# Patient Record
Sex: MALE | Race: BLACK OR AFRICAN AMERICAN | NOT HISPANIC OR LATINO | Employment: OTHER | URBAN - METROPOLITAN AREA
[De-identification: names, ages, dates, MRNs, and addresses within clinical notes are randomized per-mention and may not be internally consistent; named-entity substitution may affect disease eponyms.]

---

## 2018-01-31 ENCOUNTER — NURSE TRIAGE (OUTPATIENT)
Dept: ADMINISTRATIVE | Facility: CLINIC | Age: 63
End: 2018-01-31

## 2018-02-01 NOTE — TELEPHONE ENCOUNTER
"  Reason for Disposition   Symptoms only or mainly in 1 ear (unilateral tinnitus)    Answer Assessment - Initial Assessment Questions  Daughter calling for pt who was seen in an urgent care this week-end for c/o of hearing "crickets" in his left ear. They denied fluid or other problems with ear and he was advised to make appt with ENT. She is not aware of any meds that he is on.    Protocols used: [facility] TINNITUS-A-    "

## 2018-02-14 ENCOUNTER — HOSPITAL ENCOUNTER (OUTPATIENT)
Dept: RADIOLOGY | Facility: OTHER | Age: 63
Discharge: HOME | End: 2018-02-14
Attending: OTOLARYNGOLOGY
Payer: COMMERCIAL

## 2018-02-14 DIAGNOSIS — H93.12 TINNITUS OF LEFT EAR: ICD-10-CM

## 2018-02-14 PROCEDURE — 70553 MRI BRAIN STEM W/O & W/DYE: CPT | Mod: TC

## 2018-02-14 PROCEDURE — A9585 GADOBUTROL INJECTION: HCPCS | Performed by: OTOLARYNGOLOGY

## 2018-02-14 PROCEDURE — 25500020 PHARM REV CODE 255: Performed by: OTOLARYNGOLOGY

## 2018-02-14 PROCEDURE — 70553 MRI BRAIN STEM W/O & W/DYE: CPT | Mod: 26,,, | Performed by: RADIOLOGY

## 2018-02-14 RX ORDER — GADOBUTROL 604.72 MG/ML
10 INJECTION INTRAVENOUS
Status: COMPLETED | OUTPATIENT
Start: 2018-02-14 | End: 2018-02-14

## 2018-02-14 RX ADMIN — GADOBUTROL 10 ML: 604.72 INJECTION INTRAVENOUS at 01:02

## 2018-03-14 ENCOUNTER — HOSPITAL ENCOUNTER (OUTPATIENT)
Dept: RADIOLOGY | Facility: OTHER | Age: 63
Discharge: HOME | End: 2018-03-14
Attending: OTOLARYNGOLOGY
Payer: COMMERCIAL

## 2018-03-14 DIAGNOSIS — H93.12 LEFT-SIDED TINNITUS: ICD-10-CM

## 2018-03-14 PROCEDURE — 25500020 PHARM REV CODE 255: Performed by: OTOLARYNGOLOGY

## 2018-03-14 PROCEDURE — 70481 CT ORBIT/EAR/FOSSA W/DYE: CPT | Mod: TC

## 2018-03-14 PROCEDURE — 70481 CT ORBIT/EAR/FOSSA W/DYE: CPT | Mod: 26,,, | Performed by: INTERNAL MEDICINE

## 2018-03-14 RX ADMIN — IOHEXOL 75 ML: 350 INJECTION, SOLUTION INTRAVENOUS at 12:03

## 2019-04-28 ENCOUNTER — HOSPITAL ENCOUNTER (EMERGENCY)
Facility: HOSPITAL | Age: 64
Discharge: HOME | End: 2019-04-28
Attending: EMERGENCY MEDICINE
Payer: COMMERCIAL

## 2019-04-28 VITALS
TEMPERATURE: 99 F | RESPIRATION RATE: 16 BRPM | HEIGHT: 74 IN | DIASTOLIC BLOOD PRESSURE: 64 MMHG | HEART RATE: 73 BPM | SYSTOLIC BLOOD PRESSURE: 116 MMHG | BODY MASS INDEX: 32.08 KG/M2 | OXYGEN SATURATION: 98 % | WEIGHT: 250 LBS

## 2019-04-28 DIAGNOSIS — M25.511 SHOULDER PAIN, RIGHT: ICD-10-CM

## 2019-04-28 DIAGNOSIS — I10 HYPERTENSION: ICD-10-CM

## 2019-04-28 DIAGNOSIS — M79.10 MYALGIA: Primary | ICD-10-CM

## 2019-04-28 LAB
ANION GAP SERPL CALC-SCNC: 8 MMOL/L (ref 8–16)
BUN SERPL-MCNC: 14 MG/DL (ref 8–23)
CALCIUM SERPL-MCNC: 10.2 MG/DL (ref 8.7–10.5)
CHLORIDE SERPL-SCNC: 107 MMOL/L (ref 95–110)
CK SERPL-CCNC: 232 U/L (ref 20–200)
CO2 SERPL-SCNC: 24 MMOL/L (ref 23–29)
CREAT SERPL-MCNC: 1.4 MG/DL (ref 0.5–1.4)
EST. GFR  (AFRICAN AMERICAN): >60 ML/MIN/1.73 M^2
EST. GFR  (NON AFRICAN AMERICAN): 54 ML/MIN/1.73 M^2
GLUCOSE SERPL-MCNC: 104 MG/DL (ref 70–110)
POTASSIUM SERPL-SCNC: 4.3 MMOL/L (ref 3.5–5.1)
SODIUM SERPL-SCNC: 139 MMOL/L (ref 136–145)

## 2019-04-28 PROCEDURE — 99284 EMERGENCY DEPT VISIT MOD MDM: CPT | Mod: 25

## 2019-04-28 PROCEDURE — 63600175 PHARM REV CODE 636 W HCPCS: Performed by: EMERGENCY MEDICINE

## 2019-04-28 PROCEDURE — 82550 ASSAY OF CK (CPK): CPT

## 2019-04-28 PROCEDURE — 64450 NJX AA&/STRD OTHER PN/BRANCH: CPT

## 2019-04-28 PROCEDURE — 96374 THER/PROPH/DIAG INJ IV PUSH: CPT

## 2019-04-28 PROCEDURE — 25000003 PHARM REV CODE 250: Performed by: EMERGENCY MEDICINE

## 2019-04-28 PROCEDURE — 80048 BASIC METABOLIC PNL TOTAL CA: CPT

## 2019-04-28 RX ORDER — TADALAFIL 20 MG/1
20 TABLET ORAL
COMMUNITY
Start: 2019-03-15 | End: 2019-06-13

## 2019-04-28 RX ORDER — METHOCARBAMOL 500 MG/1
500 TABLET, FILM COATED ORAL
Status: COMPLETED | OUTPATIENT
Start: 2019-04-28 | End: 2019-04-28

## 2019-04-28 RX ORDER — CYCLOBENZAPRINE HCL 10 MG
10 TABLET ORAL 2 TIMES DAILY PRN
Qty: 15 TABLET | Refills: 0 | Status: SHIPPED | OUTPATIENT
Start: 2019-04-28 | End: 2019-05-03

## 2019-04-28 RX ORDER — LIDOCAINE HYDROCHLORIDE 20 MG/ML
5 INJECTION, SOLUTION INFILTRATION; PERINEURAL
Status: COMPLETED | OUTPATIENT
Start: 2019-04-28 | End: 2019-04-28

## 2019-04-28 RX ORDER — AMLODIPINE BESYLATE 5 MG/1
10 TABLET ORAL
Status: COMPLETED | OUTPATIENT
Start: 2019-04-28 | End: 2019-04-28

## 2019-04-28 RX ORDER — LORAZEPAM 0.5 MG/1
1 TABLET ORAL
Status: COMPLETED | OUTPATIENT
Start: 2019-04-28 | End: 2019-04-28

## 2019-04-28 RX ORDER — LORAZEPAM 1 MG/1
1 TABLET ORAL EVERY 8 HOURS PRN
Qty: 10 TABLET | Refills: 0 | Status: SHIPPED | OUTPATIENT
Start: 2019-04-29

## 2019-04-28 RX ORDER — GABAPENTIN 100 MG/1
CAPSULE ORAL
COMMUNITY
Start: 2019-04-20

## 2019-04-28 RX ORDER — AMLODIPINE BESYLATE 10 MG/1
10 TABLET ORAL
COMMUNITY
Start: 2018-06-18 | End: 2019-06-18

## 2019-04-28 RX ORDER — CLONIDINE HYDROCHLORIDE 0.1 MG/1
0.1 TABLET ORAL
Status: COMPLETED | OUTPATIENT
Start: 2019-04-28 | End: 2019-04-28

## 2019-04-28 RX ORDER — TRIAMCINOLONE ACETONIDE 40 MG/ML
40 INJECTION, SUSPENSION INTRA-ARTICULAR; INTRAMUSCULAR
Status: COMPLETED | OUTPATIENT
Start: 2019-04-28 | End: 2019-04-28

## 2019-04-28 RX ORDER — OLMESARTAN MEDOXOMIL 40 MG/1
TABLET ORAL
Refills: 3 | COMMUNITY
Start: 2019-03-08

## 2019-04-28 RX ORDER — KETOROLAC TROMETHAMINE 30 MG/ML
60 INJECTION, SOLUTION INTRAMUSCULAR; INTRAVENOUS
Status: DISCONTINUED | OUTPATIENT
Start: 2019-04-28 | End: 2019-04-28

## 2019-04-28 RX ORDER — TIZANIDINE 2 MG/1
4 TABLET ORAL
COMMUNITY
Start: 2019-03-13 | End: 2019-04-28 | Stop reason: ALTCHOICE

## 2019-04-28 RX ADMIN — METHOCARBAMOL 500 MG: 500 TABLET, FILM COATED ORAL at 03:04

## 2019-04-28 RX ADMIN — AMLODIPINE BESYLATE 10 MG: 5 TABLET ORAL at 03:04

## 2019-04-28 RX ADMIN — TRIAMCINOLONE ACETONIDE 40 MG: 40 INJECTION, SUSPENSION INTRA-ARTICULAR; INTRAMUSCULAR at 04:04

## 2019-04-28 RX ADMIN — CLONIDINE HYDROCHLORIDE 0.1 MG: 0.1 TABLET ORAL at 03:04

## 2019-04-28 RX ADMIN — LIDOCAINE HYDROCHLORIDE 5 ML: 20 INJECTION, SOLUTION INFILTRATION; PERINEURAL at 04:04

## 2019-04-28 RX ADMIN — LORAZEPAM 1 MG: 0.5 TABLET ORAL at 03:04

## 2019-04-28 NOTE — ED PROVIDER NOTES
Encounter Date: 4/28/2019    SCRIBE #1 NOTE: I, Jonny Saleem, am scribing for, and in the presence of,  Paul Loaiza MD. I have scribed the following portions of the note - Other sections scribed: HPI, ROS and PE.       History     Chief Complaint   Patient presents with    Shoulder Pain     Pt reports prior injury to R neck and shoulder. Pt comes in today for increased pain to the shoulder and no relief with Rx meds and treatments.      CC: Shoulder Pain    HPI: Patient is a 63-year-old male who presents with atraumatic right shoulder pain radiating to the neck that began 3 months ago. Patient reports he felt the sensation of a drop of water traveling from his shoulder down to his fingertips at the time of onset. He denies injury. Patient saw his PCP for the issue and received a cortisone shot; however, this only temporarily relieved the pain. He was prescribed gabapentin as well. He also saw an orthopaedist but did not receive a formal diagnosis. He denies weakness, numbness but states he intermittently has paresthesias (tinging sensation) in the 4th and 5th digits of his right hand. He denies heavy lifting. He has an appointment with pain management on Wednesday this week. He has tried Tylenol and heat compresses without relief. Left hand dominant.     The history is provided by the patient. No  was used.     Review of patient's allergies indicates:  No Known Allergies  No past medical history on file.  No past surgical history on file.  No family history on file.  Social History     Tobacco Use    Smoking status: Not on file   Substance Use Topics    Alcohol use: Not on file    Drug use: Not on file     Review of Systems   Constitutional: Negative for activity change, diaphoresis, fatigue, fever and unexpected weight change.   HENT: Negative for congestion, ear discharge, facial swelling, sinus pressure, sinus pain and trouble swallowing.    Eyes: Negative for pain, discharge and  redness.   Respiratory: Negative for apnea, cough, choking, chest tightness, shortness of breath, wheezing and stridor.    Cardiovascular: Negative for chest pain, palpitations and leg swelling.   Gastrointestinal: Negative for abdominal distention, abdominal pain, diarrhea, nausea and vomiting.   Genitourinary: Negative for difficulty urinating, dysuria and urgency.   Musculoskeletal: Positive for arthralgias (R shoulder), back pain, myalgias and neck pain. Negative for gait problem, joint swelling and neck stiffness.   Skin: Negative for color change, pallor, rash and wound.   Neurological: Negative for dizziness, tremors, seizures, syncope, facial asymmetry, speech difficulty, weakness, light-headedness, numbness and headaches.        (+) paresthesias   Psychiatric/Behavioral: Negative for agitation, behavioral problems, decreased concentration and suicidal ideas.   All other systems reviewed and are negative.      Physical Exam     Initial Vitals [04/28/19 1422]   BP Pulse Resp Temp SpO2   (!) 184/107 75 18 98.6 °F (37 °C) 97 %      MAP       --         Physical Exam    Nursing note and vitals reviewed.  Constitutional: He appears well-developed and well-nourished. He is not diaphoretic. No distress.   HENT:   Head: Normocephalic and atraumatic.   Right Ear: External ear normal.   Left Ear: External ear normal.   Nose: Nose normal.   Mouth/Throat: Oropharynx is clear and moist. No oropharyngeal exudate.   Eyes: Conjunctivae and EOM are normal. Pupils are equal, round, and reactive to light. Right eye exhibits no discharge. Left eye exhibits no discharge. No scleral icterus.   Neck: Normal range of motion. Neck supple. No thyromegaly present. No tracheal deviation present. No JVD present.   Cardiovascular: Normal rate, regular rhythm, normal heart sounds and intact distal pulses.   Pulmonary/Chest: Breath sounds normal. No stridor. No respiratory distress. He has no wheezes. He has no rales.   Abdominal: Soft.  Bowel sounds are normal. He exhibits no distension. There is no tenderness. There is no rebound and no guarding.   Musculoskeletal: Normal range of motion. He exhibits tenderness. He exhibits no edema.   Right shoulder with increase in pain with supination/pronation of arm. Patient is unable to fully place his arm behind his back secondary to pain. No overt deformity of right shoulder. Right trapezius muscle is overall moderately tender to touch with intermittent spasms. Pinpoint tenderness over the upper midline right trapezius. No midline cervical spine tenderness.    Neurological: He is alert and oriented to person, place, and time. He has normal strength. He displays normal reflexes. No cranial nerve deficit or sensory deficit. GCS score is 15. GCS eye subscore is 4. GCS verbal subscore is 5. GCS motor subscore is 6.   Skin: Skin is warm and dry. Capillary refill takes less than 2 seconds. No rash noted. No erythema.   Psychiatric: He has a normal mood and affect. His behavior is normal. Judgment and thought content normal.         ED Course   Nerve Block  Date/Time: 5/3/2019 4:01 PM  Location procedure was performed: [facility] EMERGENCY DEPARTMENT  Performed by: Paul Loaiza MD  Authorized by: Paul Loaiza MD   Assisting provider: Paul Loaiza MD  Pre-operative diagnosis: Right shoulder and trapezius pain.  Post-operative diagnosis: Right shoulder and trapezius pain.   Consent Done: Yes  Consent: Verbal consent obtained. Written consent obtained.  Risks and benefits: risks, benefits and alternatives were discussed  Consent given by: patient (Patient's daughter also present (EM RN) and gave consent per patient's request)  Patient understanding: patient states understanding of the procedure being performed  Patient consent: the patient's understanding of the procedure matches consent given  Procedure consent: procedure consent matches procedure scheduled  Relevant documents: relevant documents  "present and verified  Test results: test results available and properly labeled  Site marked: the operative site was marked  Imaging studies: imaging studies available  Required items: required blood products, implants, devices, and special equipment available  Patient identity confirmed: name, MRN and verbally with patient  Time out: Immediately prior to procedure a "time out" was called to verify the correct patient, procedure, equipment, support staff and site/side marked as required.  Indications: pain relief  Body area: trunk  Laterality: right  Patient sedated: no  : Kenalog Description of findings: Area of maximum TTP identified.    Preparation: Patient was prepped and draped in the usual sterile fashion.  Patient position: sitting  Needle size: 25 G  Local Anesthetic: lidocaine 2% without epinephrine  Anesthetic total: 2 mL  Technical procedures used: Using sterile technique the needle was advanced under constant negative pressure until approximately 1cm beneath epidermis. 2cc of 2% lidocaine and 40mg of Kenalog injected.   Significant surgical tasks conducted by the assistant(s): Performed by myself.   Complications: No  Estimated blood loss (mL): 0  Specimens: No  Implants: No  Outcome: pain improved  Patient tolerance: Patient tolerated the procedure well with no immediate complications  Comments: After procedure, patient reports significant improvement in pain. On examination patient is able to supinate and pronate his arm without pain and is able to fully place this arm behind his back. Daughter reports he looks much more relaxed and is no longer holding his at a slight downward angle.         Labs Reviewed   BASIC METABOLIC PANEL - Abnormal; Notable for the following components:       Result Value    eGFR if non  54 (*)     All other components within normal limits   CK - Abnormal; Notable for the following components:     (*)     All other components within normal limits      "     Imaging Results          X-Ray Chest PA And Lateral (Final result)  Result time 04/28/19 16:32:59    Final result by Franca Salas MD (04/28/19 16:32:59)                 Impression:      Minimal subsegmental atelectasis      Electronically signed by: Franca Salas MD  Date:    04/28/2019  Time:    16:32             Narrative:    EXAMINATION:  XR CHEST PA AND LATERAL    CLINICAL HISTORY:  Essential (primary) hypertension    TECHNIQUE:  PA and lateral views of the chest were performed.    COMPARISON:  None    FINDINGS:  The cardiac silhouette is normal in size.  The pulmonary vascularity is normal.  Low lung volume, poor inspiratory effort.  Minimal subsegmental atelectasis at the lung bases.  No pleural effusion.  No pneumothorax.  The osseous structures appear within normal limits.                              Vitals:    04/28/19 1631 04/28/19 1700 04/28/19 1716 04/28/19 1731   BP: 131/80  139/85 116/64   Pulse: 72      Resp: 14 16     Temp:       TempSrc:       SpO2: 98%      Weight:       Height:        04/28/19 1734   BP:    Pulse: 73   Resp: 16   Temp: 98.5 °F (36.9 °C)   TempSrc:    SpO2: 98%   Weight:    Height:      Medications   LORazepam tablet 1 mg (1 mg Oral Given 4/28/19 1547)   methocarbamol tablet 500 mg (500 mg Oral Given 4/28/19 1547)   amLODIPine tablet 10 mg (10 mg Oral Given 4/28/19 1547)   cloNIDine tablet 0.1 mg (0.1 mg Oral Given 4/28/19 1547)   triamcinolone acetonide injection 40 mg (40 mg Other Given 4/28/19 1653)   lidocaine HCL 20 mg/ml (2%) injection 5 mL (5 mLs Infiltration Given 4/28/19 1653)          Medical Decision Making:   ED Management:  Patient appears to have musculoskeletal pain originating in right trapezius mm and causing intermittent spasms and paraesthesias.     Patient pain significantly improved with local lidocaine/steroid injection. Patient and daughter given information on physical therapy in order to access if this would be effective prior to  starting a pain medication regimen.     Patient and daughter are agreeable with plan. Strict return precautions discussed.             Scribe Attestation:   Scribe #1: I performed the above scribed service and the documentation accurately describes the services I performed. I attest to the accuracy of the note.    Attending Attestation:           Physician Attestation for Scribe:  Physician Attestation Statement for Scribe #1: I, Paul Loaiza MD, reviewed documentation, as scribed by Jonny Saleem in my presence, and it is both accurate and complete.                    Clinical Impression:       ICD-10-CM ICD-9-CM   1. Myalgia M79.10 729.1   2. Shoulder pain, right M25.511 719.41   3. Hypertension I10 401.9         Disposition:   Disposition: Discharged                        Paul Loaiza MD  05/03/19 0139

## 2019-04-28 NOTE — ED TRIAGE NOTES
Pt arrived to the ED due to right sided shoulder pain that radiates down pts right arm, left shoulder, and upper back that has been going on for 3 months now. Pt denies SOB, fever, n/v, and chills

## 2021-07-01 ENCOUNTER — PATIENT MESSAGE (OUTPATIENT)
Dept: ADMINISTRATIVE | Facility: OTHER | Age: 66
End: 2021-07-01

## 2022-01-24 ENCOUNTER — OFFICE VISIT (OUTPATIENT)
Dept: URGENT CARE | Facility: CLINIC | Age: 67
End: 2022-01-24
Payer: COMMERCIAL

## 2022-01-24 VITALS
OXYGEN SATURATION: 98 % | WEIGHT: 252 LBS | DIASTOLIC BLOOD PRESSURE: 95 MMHG | TEMPERATURE: 97 F | HEIGHT: 74 IN | RESPIRATION RATE: 18 BRPM | SYSTOLIC BLOOD PRESSURE: 172 MMHG | HEART RATE: 69 BPM | BODY MASS INDEX: 32.34 KG/M2

## 2022-01-24 DIAGNOSIS — M62.838 MUSCLE SPASMS OF NECK: ICD-10-CM

## 2022-01-24 DIAGNOSIS — S16.1XXA STRAIN OF NECK MUSCLE, INITIAL ENCOUNTER: Primary | ICD-10-CM

## 2022-01-24 PROCEDURE — 3080F PR MOST RECENT DIASTOLIC BLOOD PRESSURE >= 90 MM HG: ICD-10-PCS | Mod: CPTII,S$GLB,, | Performed by: PHYSICIAN ASSISTANT

## 2022-01-24 PROCEDURE — 3008F PR BODY MASS INDEX (BMI) DOCUMENTED: ICD-10-PCS | Mod: CPTII,S$GLB,, | Performed by: PHYSICIAN ASSISTANT

## 2022-01-24 PROCEDURE — 3008F BODY MASS INDEX DOCD: CPT | Mod: CPTII,S$GLB,, | Performed by: PHYSICIAN ASSISTANT

## 2022-01-24 PROCEDURE — 4010F ACE/ARB THERAPY RXD/TAKEN: CPT | Mod: CPTII,S$GLB,, | Performed by: PHYSICIAN ASSISTANT

## 2022-01-24 PROCEDURE — 4010F PR ACE/ARB THEARPY RXD/TAKEN: ICD-10-PCS | Mod: CPTII,S$GLB,, | Performed by: PHYSICIAN ASSISTANT

## 2022-01-24 PROCEDURE — 99203 PR OFFICE/OUTPT VISIT, NEW, LEVL III, 30-44 MIN: ICD-10-PCS | Mod: S$GLB,,, | Performed by: PHYSICIAN ASSISTANT

## 2022-01-24 PROCEDURE — 3080F DIAST BP >= 90 MM HG: CPT | Mod: CPTII,S$GLB,, | Performed by: PHYSICIAN ASSISTANT

## 2022-01-24 PROCEDURE — 1160F PR REVIEW ALL MEDS BY PRESCRIBER/CLIN PHARMACIST DOCUMENTED: ICD-10-PCS | Mod: CPTII,S$GLB,, | Performed by: PHYSICIAN ASSISTANT

## 2022-01-24 PROCEDURE — 1160F RVW MEDS BY RX/DR IN RCRD: CPT | Mod: CPTII,S$GLB,, | Performed by: PHYSICIAN ASSISTANT

## 2022-01-24 PROCEDURE — 1159F PR MEDICATION LIST DOCUMENTED IN MEDICAL RECORD: ICD-10-PCS | Mod: CPTII,S$GLB,, | Performed by: PHYSICIAN ASSISTANT

## 2022-01-24 PROCEDURE — 99203 OFFICE O/P NEW LOW 30 MIN: CPT | Mod: S$GLB,,, | Performed by: PHYSICIAN ASSISTANT

## 2022-01-24 PROCEDURE — 3077F SYST BP >= 140 MM HG: CPT | Mod: CPTII,S$GLB,, | Performed by: PHYSICIAN ASSISTANT

## 2022-01-24 PROCEDURE — 1159F MED LIST DOCD IN RCRD: CPT | Mod: CPTII,S$GLB,, | Performed by: PHYSICIAN ASSISTANT

## 2022-01-24 PROCEDURE — 3077F PR MOST RECENT SYSTOLIC BLOOD PRESSURE >= 140 MM HG: ICD-10-PCS | Mod: CPTII,S$GLB,, | Performed by: PHYSICIAN ASSISTANT

## 2022-01-24 RX ORDER — AMLODIPINE BESYLATE 5 MG/1
5 TABLET ORAL
COMMUNITY
Start: 2021-10-20 | End: 2022-10-20

## 2022-01-24 RX ORDER — METHOCARBAMOL 500 MG/1
1000 TABLET, FILM COATED ORAL 3 TIMES DAILY
Qty: 30 TABLET | Refills: 0 | Status: SHIPPED | OUTPATIENT
Start: 2022-01-24 | End: 2022-01-31

## 2022-01-24 RX ORDER — ASPIRIN 81 MG/1
81 TABLET ORAL
COMMUNITY

## 2022-01-24 NOTE — PROGRESS NOTES
"Subjective:       Patient ID: Sammy Eller is a 66 y.o. male.    Vitals:  height is 6' 2" (1.88 m) and weight is 114.3 kg (252 lb). His tympanic temperature is 97.4 °F (36.3 °C). His blood pressure is 172/95 (abnormal) and his pulse is 69. His respiration is 18 and oxygen saturation is 98%.     Chief Complaint: Otalgia (Left ear) and Neck Pain    Pt states that he  Is having left ear pain and neck stiffness that started 5 days ago.  He says he thinks he slept funny.  Pt is taking tylenol .     Otalgia   There is pain in the left ear. This is a new problem. The current episode started in the past 7 days. The problem occurs constantly. The problem has been unchanged. There has been no fever. The pain is at a severity of 3/10. The pain is mild. Associated symptoms include neck pain. Pertinent negatives include no diarrhea, headaches, rash or vomiting. He has tried acetaminophen for the symptoms.       Constitution: Negative for chills and fever.   HENT: Positive for ear pain. Negative for congestion.    Neck: Positive for neck pain and neck stiffness.   Cardiovascular: Negative for chest pain.   Respiratory: Negative for shortness of breath.    Gastrointestinal: Negative for nausea, vomiting and diarrhea.   Musculoskeletal: Negative for pain, joint pain and back pain.   Skin: Negative for color change, rash and wound.   Neurological: Negative for dizziness, headaches, numbness and tingling.   Psychiatric/Behavioral: Negative for nervous/anxious. The patient is not nervous/anxious.        Objective:      Physical Exam   Constitutional: He is oriented to person, place, and time. He appears well-developed. He is cooperative.  Non-toxic appearance. He does not appear ill. No distress.   HENT:   Head: Normocephalic and atraumatic.   Ears:   Right Ear: Hearing normal.   Left Ear: Hearing normal.   Eyes: Conjunctivae and lids are normal. No scleral icterus.   Neck: Trachea normal and phonation normal. Neck supple.          " Comments: Neck:  He is able to flex full but with pain.  He is unable to extend secondary to pain.  He is able to lateral flex in both directions to approximately 15 degrees with pain.  He is able to rotate in both directions to approximately 30 degrees but with pain.  No edema present. No erythema present. No neck rigidity present. decreased range of motion present. pain with movement present. No spinous process tenderness present. muscular tenderness (left) present.   Cardiovascular: Normal rate, regular rhythm, normal heart sounds and normal pulses.   Pulmonary/Chest: Effort normal and breath sounds normal. No respiratory distress. He has no decreased breath sounds. He has no rhonchi.   Abdominal: Normal appearance.   Musculoskeletal:         General: No tenderness or deformity.   Neurological: He is alert and oriented to person, place, and time. He exhibits normal muscle tone. Coordination normal.   Skin: Skin is warm, dry, intact, not diaphoretic, not pale and no rash.   Psychiatric: His speech is normal and behavior is normal. Judgment and thought content normal.   Nursing note and vitals reviewed.        Assessment:       1. Strain of neck muscle, initial encounter    2. Muscle spasms of neck          Plan:         Strain of neck muscle, initial encounter  -     methocarbamoL (ROBAXIN) 500 MG Tab; Take 2 tablets (1,000 mg total) by mouth 3 (three) times daily. for 7 days  Dispense: 30 tablet; Refill: 0    Muscle spasms of neck  -     methocarbamoL (ROBAXIN) 500 MG Tab; Take 2 tablets (1,000 mg total) by mouth 3 (three) times daily. for 7 days  Dispense: 30 tablet; Refill: 0                 Patient Instructions   - Rest.  - Drink plenty of fluids.  - Take Tylenol and/or Ibuprofen as directed as needed for fever/pain.  Do not take more than the recommended dose.  - follow up with your PCP within the next 1-2 weeks as needed.  - No heavy lifting.    - Low heat to areas of pain for 20 minutes at a time.  - Go to  the ER for any weakness or sensation loss of the legs, or loss of bowel or bladder control.  - You must understand that you have received an Urgent Care treatment only and that you may be released before all of your medical problems are known or treated.   - You, the patient, will arrange for follow up care as instructed.   - If your condition worsens or fails to improve we recommend that you receive another evaluation at the ER immediately or contact your PCP to discuss your concerns.   - You can call [phone] or [phone] to help schedule an appointment with the appropriate provider.    Patient Education       Cervical Muscle Strain Discharge Instructions   About this topic   A cervical muscle strain happens when the muscles in your neck are is stretched too much. This is the medical name for a whiplash injury that happens when your head is suddenly jerked forward and backward. A cervical strain most often happens from a car crash or sports injury.  Your neck has many parts including bones, muscles, tendons, ligaments, and nerves. Vertebrae, the bones in your spine, start at the base of your skull and extend down the back of your neck. There are discs between the vertebrae to cushion the bones. Ligaments, muscles, and tendons help hold your spine in place and let you move your neck. Your spinal cord, the major nerve of your body, starts at the base of your brain and extends down your back. It is protected by your vertebrae. Smaller nerves travel from your spinal cord to your muscles and skin. Most neck pain is caused by an injury to a ligament, tendon, muscle, or nerve.     What care is needed at home?   · Ask your doctor what you need to do when you go home. Make sure you ask questions if you do not understand what the doctor says. This way you will know what you need to do.  · Wear your neck brace or cushion as you were told to. If the doctor told you to, you may start doing gentle neck stretches in a  few days.  · For recent sprains, place an ice pack or a bag of frozen vegetables wrapped in a towel over the painful part. Never put ice right on the skin. Use ice every 1 to 2 hours for 10 to 15 minutes at a time. Use for the first 24 to 48 hours after your injury.  · Use heat after the first 24 to 48 hours, but not right away. Put a heating pad on the painful part for no more than 20 minutes at a time. Never go to sleep with a heating pad on as this can cause burns. You can also take a hot shower or bath.  · You may want to take medicines like ibuprofen or naproxen for swelling and pain. These are nonsteroidal anti-inflammatory drugs (NSAIDs).  · Try to practice good posture to avoid putting strain on your neck. Sit up straight and keep your shoulders back. It can also help to avoid sitting in the same position for too long and to avoid putting pressure on your upper back by carrying heavy things. When you sleep, try to keep your neck in line with the rest of your body.  What follow-up care is needed?   Your doctor may ask you to make visits to the office to check on your progress. Be sure to keep these visits. Your doctor may send you to physical therapy or a chiropractor to help you heal faster.  What drugs may be needed?   The doctor may order drugs to:  · Help with pain and swelling  · Relax muscles  What can be done to prevent this health problem?   · Always wear a seat belt. Drive safely. Obey speed limits. Do not drink and drive. Do not text and drive.  · Have headrests in the car at the right height. The middle of the headrest should be even with the upper parts of your ears.  · Use good posture. Do not slouch.  · Take breaks often when doing things that use repeat movements.  · Warm up slowly and stretch before you work out. Use good ways to train, such as slowly adding to how far you run. Do not work out if you are overly tired. Take extra care if working out in cold weather.  · Keep a healthy weight so  there is not extra stress on your joints. Eat a healthy diet to keep your muscles healthy.  · Stay active and work out to keep your muscles strong and flexible.  · Use the proper pillow.  When do I need to call the doctor?   · You have trouble breathing.  · You are too weak to stand or cannot move one or both of your arms or legs.  · You have new weakness in one or both of your arms or legs.  · You have numbness, tingling, or shooting pain in one or both of your arms or legs.  · You have bad pain that is not helped by pain medicine.  · Your symptoms are not getting better after treating them at home for a few weeks.  · Your symptoms are getting worse and you cannot do normal activities like dress yourself or eat.  Teach Back: Helping You Understand   The Teach Back Method helps you understand the information we are giving you. After you talk with the staff, tell them in your own words what you learned. This helps to make sure the staff has described each thing clearly. It also helps to explain things that may have been confusing. Before going home, make sure you can do these:  · I can tell you about my condition.  · I can tell you what may help ease my pain.  · I can tell you what I will do if I have more pain or swelling.  Where can I learn more?   American [employer]  http://orthoinfo.aaos.org/topic.cfm?hjusy=N27649   [employer]  https://www.betterhealth.[place].gov.[place]/health/ConditionsAndTreatments/neck-pain   Last Reviewed Date   2021-06-10  Consumer Information Use and Disclaimer   This information is not specific medical advice and does not replace information you receive from your health care provider. This is only a brief summary of general information. It does NOT include all information about conditions, illnesses, injuries, tests, procedures, treatments, therapies, discharge instructions or life-style choices that may apply to you. You must talk with your health care provider  for complete information about your health and treatment options. This information should not be used to decide whether or not to accept your health care providers advice, instructions or recommendations. Only your health care provider has the knowledge and training to provide advice that is right for you.  Copyright   Copyright © 2021 [employer] and its affiliates and/or licensors. All rights reserved.  Patient Education       Using Heat for Pain   About this topic   Heat is one of a few ways to help manage pain. Heat increases blood flow to an area. Heat also relaxes tight and sore muscles.  Heat can help many problems, such as:  · Arthritis  · Back or neck pain  · Generalized stiffness  · Cramps or muscle spasms  · Fibromyalgia  · Trigger points  · Extremity pain  · Bursitis  · Headaches  · Muscle soreness  General   Doctors and therapists may use heat as a part of your care. Here are ways you can use heat at home:  · Hot tubs, steam room, sauna, or a hot bath - Be sure that these are not too hot. Be sure not to stay in the bath or hot tub for too long. Take extra care when getting out.  · Hot water bottles - Cover and place on the sore area.  · Electric heating pad or hot water bottle - Cover and place on the sore area. Be sure to have a thin layer between the heat and skin. Never sleep with a heating pad on as this can cause burns. Use heat on your painful part for no more than 20 minutes at a time.  · Hot compress - Wet a washcloth with hot water and then put it on the sore area.  · Heat wraps or patches - You can buy these single use patches in stores.  · Gel or rice packs - Heat in a microwave oven and place on your sore part. Check to make sure they are not too hot.  · Paraffin wax - You dip your hand or foot into melted wax several times. Then wrap it with plastic or a towel for 10 to 20 minutes.  Do not use heat or talk to your doctor first if you:  · Are pregnant  · Have swelling  · Have an  infection  · Have skin problems like blisters, open sores, or dermatitis  · Have problems with sensation or feeling  · Have conditions like diabetes, heart problems, or blood pressure problems  · Have trouble with blood vessels or blood clots  What problems could happen?   · Burns  · Increased swelling  · Blisters  · Contact dermatitis  · Pain continues  Helpful tips   · Most often, you should not use heat within the first 48 hours after an injury.  · Make your own microwavable heat pack. Fill a long sock with rice. Sew or tie the end shut. Heat in the microwave 30 seconds at a time for up to 2 minutes until you get the desired temperature.  · Warm your clothes in the dryer before you put them on for all over comfort.  · Call your doctor if you see discolored skin, blisters, or swelling underneath the area where you had the heat source.  Last Reviewed Date   2020-06-02  Consumer Information Use and Disclaimer   This information is not specific medical advice and does not replace information you receive from your health care provider. This is only a brief summary of general information. It does NOT include all information about conditions, illnesses, injuries, tests, procedures, treatments, therapies, discharge instructions or life-style choices that may apply to you. You must talk with your health care provider for complete information about your health and treatment options. This information should not be used to decide whether or not to accept your health care providers advice, instructions or recommendations. Only your health care provider has the knowledge and training to provide advice that is right for you.  Copyright   Copyright © 2021 [employer] and its affiliates and/or licensors. All rights reserved.

## 2022-01-24 NOTE — PATIENT INSTRUCTIONS
- Rest.  - Drink plenty of fluids.  - Take Tylenol and/or Ibuprofen as directed as needed for fever/pain.  Do not take more than the recommended dose.  - follow up with your PCP within the next 1-2 weeks as needed.  - No heavy lifting.    - Low heat to areas of pain for 20 minutes at a time.  - Go to the ER for any weakness or sensation loss of the legs, or loss of bowel or bladder control.  - You must understand that you have received an Urgent Care treatment only and that you may be released before all of your medical problems are known or treated.   - You, the patient, will arrange for follow up care as instructed.   - If your condition worsens or fails to improve we recommend that you receive another evaluation at the ER immediately or contact your PCP to discuss your concerns.   - You can call [phone] or [phone] to help schedule an appointment with the appropriate provider.    Patient Education       Cervical Muscle Strain Discharge Instructions   About this topic   A cervical muscle strain happens when the muscles in your neck are is stretched too much. This is the medical name for a whiplash injury that happens when your head is suddenly jerked forward and backward. A cervical strain most often happens from a car crash or sports injury.  Your neck has many parts including bones, muscles, tendons, ligaments, and nerves. Vertebrae, the bones in your spine, start at the base of your skull and extend down the back of your neck. There are discs between the vertebrae to cushion the bones. Ligaments, muscles, and tendons help hold your spine in place and let you move your neck. Your spinal cord, the major nerve of your body, starts at the base of your brain and extends down your back. It is protected by your vertebrae. Smaller nerves travel from your spinal cord to your muscles and skin. Most neck pain is caused by an injury to a ligament, tendon, muscle, or nerve.     What care is needed at home?    · Ask your doctor what you need to do when you go home. Make sure you ask questions if you do not understand what the doctor says. This way you will know what you need to do.  · Wear your neck brace or cushion as you were told to. If the doctor told you to, you may start doing gentle neck stretches in a few days.  · For recent sprains, place an ice pack or a bag of frozen vegetables wrapped in a towel over the painful part. Never put ice right on the skin. Use ice every 1 to 2 hours for 10 to 15 minutes at a time. Use for the first 24 to 48 hours after your injury.  · Use heat after the first 24 to 48 hours, but not right away. Put a heating pad on the painful part for no more than 20 minutes at a time. Never go to sleep with a heating pad on as this can cause burns. You can also take a hot shower or bath.  · You may want to take medicines like ibuprofen or naproxen for swelling and pain. These are nonsteroidal anti-inflammatory drugs (NSAIDs).  · Try to practice good posture to avoid putting strain on your neck. Sit up straight and keep your shoulders back. It can also help to avoid sitting in the same position for too long and to avoid putting pressure on your upper back by carrying heavy things. When you sleep, try to keep your neck in line with the rest of your body.  What follow-up care is needed?   Your doctor may ask you to make visits to the office to check on your progress. Be sure to keep these visits. Your doctor may send you to physical therapy or a chiropractor to help you heal faster.  What drugs may be needed?   The doctor may order drugs to:  · Help with pain and swelling  · Relax muscles  What can be done to prevent this health problem?   · Always wear a seat belt. Drive safely. Obey speed limits. Do not drink and drive. Do not text and drive.  · Have headrests in the car at the right height. The middle of the headrest should be even with the upper parts of your ears.  · Use good posture. Do not  slouch.  · Take breaks often when doing things that use repeat movements.  · Warm up slowly and stretch before you work out. Use good ways to train, such as slowly adding to how far you run. Do not work out if you are overly tired. Take extra care if working out in cold weather.  · Keep a healthy weight so there is not extra stress on your joints. Eat a healthy diet to keep your muscles healthy.  · Stay active and work out to keep your muscles strong and flexible.  · Use the proper pillow.  When do I need to call the doctor?   · You have trouble breathing.  · You are too weak to stand or cannot move one or both of your arms or legs.  · You have new weakness in one or both of your arms or legs.  · You have numbness, tingling, or shooting pain in one or both of your arms or legs.  · You have bad pain that is not helped by pain medicine.  · Your symptoms are not getting better after treating them at home for a few weeks.  · Your symptoms are getting worse and you cannot do normal activities like dress yourself or eat.  Teach Back: Helping You Understand   The Teach Back Method helps you understand the information we are giving you. After you talk with the staff, tell them in your own words what you learned. This helps to make sure the staff has described each thing clearly. It also helps to explain things that may have been confusing. Before going home, make sure you can do these:  · I can tell you about my condition.  · I can tell you what may help ease my pain.  · I can tell you what I will do if I have more pain or swelling.  Where can I learn more?   American Academy of Orthopedic Surgeons  http://orthoinfo.aaos.org/topic.cfm?wbasz=E56686   [employer]  https://www.betterhealth.[place].gov.[place]/health/ConditionsAndTreatments/neck-pain   Last Reviewed Date   2021-06-10  Consumer Information Use and Disclaimer   This information is not specific medical advice and does not replace information you receive from your  health care provider. This is only a brief summary of general information. It does NOT include all information about conditions, illnesses, injuries, tests, procedures, treatments, therapies, discharge instructions or life-style choices that may apply to you. You must talk with your health care provider for complete information about your health and treatment options. This information should not be used to decide whether or not to accept your health care providers advice, instructions or recommendations. Only your health care provider has the knowledge and training to provide advice that is right for you.  Copyright   Copyright © 2021 [employer], [employer]. and its affiliates and/or licensors. All rights reserved.  Patient Education       Using Heat for Pain   About this topic   Heat is one of a few ways to help manage pain. Heat increases blood flow to an area. Heat also relaxes tight and sore muscles.  Heat can help many problems, such as:  · Arthritis  · Back or neck pain  · Generalized stiffness  · Cramps or muscle spasms  · Fibromyalgia  · Trigger points  · Extremity pain  · Bursitis  · Headaches  · Muscle soreness  General   Doctors and therapists may use heat as a part of your care. Here are ways you can use heat at home:  · Hot tubs, steam room, sauna, or a hot bath - Be sure that these are not too hot. Be sure not to stay in the bath or hot tub for too long. Take extra care when getting out.  · Hot water bottles - Cover and place on the sore area.  · Electric heating pad or hot water bottle - Cover and place on the sore area. Be sure to have a thin layer between the heat and skin. Never sleep with a heating pad on as this can cause burns. Use heat on your painful part for no more than 20 minutes at a time.  · Hot compress - Wet a washcloth with hot water and then put it on the sore area.  · Heat wraps or patches - You can buy these single use patches in stores.  · Gel or rice packs - Heat in a microwave oven and  place on your sore part. Check to make sure they are not too hot.  · Paraffin wax - You dip your hand or foot into melted wax several times. Then wrap it with plastic or a towel for 10 to 20 minutes.  Do not use heat or talk to your doctor first if you:  · Are pregnant  · Have swelling  · Have an infection  · Have skin problems like blisters, open sores, or dermatitis  · Have problems with sensation or feeling  · Have conditions like diabetes, heart problems, or blood pressure problems  · Have trouble with blood vessels or blood clots  What problems could happen?   · Burns  · Increased swelling  · Blisters  · Contact dermatitis  · Pain continues  Helpful tips   · Most often, you should not use heat within the first 48 hours after an injury.  · Make your own microwavable heat pack. Fill a long sock with rice. Sew or tie the end shut. Heat in the microwave 30 seconds at a time for up to 2 minutes until you get the desired temperature.  · Warm your clothes in the dryer before you put them on for all over comfort.  · Call your doctor if you see discolored skin, blisters, or swelling underneath the area where you had the heat source.  Last Reviewed Date   2020-06-02  Consumer Information Use and Disclaimer   This information is not specific medical advice and does not replace information you receive from your health care provider. This is only a brief summary of general information. It does NOT include all information about conditions, illnesses, injuries, tests, procedures, treatments, therapies, discharge instructions or life-style choices that may apply to you. You must talk with your health care provider for complete information about your health and treatment options. This information should not be used to decide whether or not to accept your health care providers advice, instructions or recommendations. Only your health care provider has the knowledge and training to provide advice that is right for  you.  Copyright   Copyright © 2021 [employer] and its affiliates and/or licensors. All rights reserved.

## 2022-08-23 ENCOUNTER — OFFICE VISIT (OUTPATIENT)
Dept: URGENT CARE | Facility: CLINIC | Age: 67
End: 2022-08-23
Payer: COMMERCIAL

## 2022-08-23 VITALS
HEIGHT: 74 IN | DIASTOLIC BLOOD PRESSURE: 90 MMHG | TEMPERATURE: 98 F | HEART RATE: 59 BPM | BODY MASS INDEX: 32.34 KG/M2 | WEIGHT: 252 LBS | SYSTOLIC BLOOD PRESSURE: 149 MMHG | OXYGEN SATURATION: 96 % | RESPIRATION RATE: 16 BRPM

## 2022-08-23 DIAGNOSIS — T63.441A BEE STING, ACCIDENTAL OR UNINTENTIONAL, INITIAL ENCOUNTER: Primary | ICD-10-CM

## 2022-08-23 PROCEDURE — 1159F MED LIST DOCD IN RCRD: CPT | Mod: CPTII,S$GLB,, | Performed by: NURSE PRACTITIONER

## 2022-08-23 PROCEDURE — 3077F SYST BP >= 140 MM HG: CPT | Mod: CPTII,S$GLB,, | Performed by: NURSE PRACTITIONER

## 2022-08-23 PROCEDURE — 99213 OFFICE O/P EST LOW 20 MIN: CPT | Mod: S$GLB,,, | Performed by: NURSE PRACTITIONER

## 2022-08-23 PROCEDURE — 1160F PR REVIEW ALL MEDS BY PRESCRIBER/CLIN PHARMACIST DOCUMENTED: ICD-10-PCS | Mod: CPTII,S$GLB,, | Performed by: NURSE PRACTITIONER

## 2022-08-23 PROCEDURE — 99213 PR OFFICE/OUTPT VISIT, EST, LEVL III, 20-29 MIN: ICD-10-PCS | Mod: S$GLB,,, | Performed by: NURSE PRACTITIONER

## 2022-08-23 PROCEDURE — 3080F PR MOST RECENT DIASTOLIC BLOOD PRESSURE >= 90 MM HG: ICD-10-PCS | Mod: CPTII,S$GLB,, | Performed by: NURSE PRACTITIONER

## 2022-08-23 PROCEDURE — 4010F PR ACE/ARB THEARPY RXD/TAKEN: ICD-10-PCS | Mod: CPTII,S$GLB,, | Performed by: NURSE PRACTITIONER

## 2022-08-23 PROCEDURE — 3080F DIAST BP >= 90 MM HG: CPT | Mod: CPTII,S$GLB,, | Performed by: NURSE PRACTITIONER

## 2022-08-23 PROCEDURE — 3008F PR BODY MASS INDEX (BMI) DOCUMENTED: ICD-10-PCS | Mod: CPTII,S$GLB,, | Performed by: NURSE PRACTITIONER

## 2022-08-23 PROCEDURE — 3008F BODY MASS INDEX DOCD: CPT | Mod: CPTII,S$GLB,, | Performed by: NURSE PRACTITIONER

## 2022-08-23 PROCEDURE — 1160F RVW MEDS BY RX/DR IN RCRD: CPT | Mod: CPTII,S$GLB,, | Performed by: NURSE PRACTITIONER

## 2022-08-23 PROCEDURE — 3077F PR MOST RECENT SYSTOLIC BLOOD PRESSURE >= 140 MM HG: ICD-10-PCS | Mod: CPTII,S$GLB,, | Performed by: NURSE PRACTITIONER

## 2022-08-23 PROCEDURE — 1159F PR MEDICATION LIST DOCUMENTED IN MEDICAL RECORD: ICD-10-PCS | Mod: CPTII,S$GLB,, | Performed by: NURSE PRACTITIONER

## 2022-08-23 PROCEDURE — 4010F ACE/ARB THERAPY RXD/TAKEN: CPT | Mod: CPTII,S$GLB,, | Performed by: NURSE PRACTITIONER

## 2022-08-23 RX ORDER — ROSUVASTATIN CALCIUM 20 MG/1
20 TABLET, COATED ORAL DAILY
COMMUNITY
Start: 2022-06-20

## 2022-08-23 RX ORDER — EPINEPHRINE 1 MG/ML
0.3 INJECTION, SOLUTION, CONCENTRATE INTRAVENOUS ONCE
Qty: 0.3 ML | Refills: 0 | Status: SHIPPED | OUTPATIENT
Start: 2022-08-23 | End: 2022-08-23

## 2022-08-23 NOTE — PROGRESS NOTES
"Subjective:       Patient ID: Sammy Eller is a 66 y.o. male.    Vitals:  height is 6' 2" (1.88 m) and weight is 114.3 kg (252 lb). His oral temperature is 98.1 °F (36.7 °C). His blood pressure is 149/90 (abnormal) and his pulse is 59 (abnormal). His respiration is 16 and oxygen saturation is 96%.     Chief Complaint: Insect Bite    Patient is here for right eye swelling and R facial swelling from a bee sting, symptom onset 2 days ago. Took benadryl 50 mg with improvement in facial swelling. No visual changes. No c/o SOB, wheezing, throat or tongue swelling. Family member requesting epi pen. No hx anaphylaxis.     Insect Bite  This is a new problem. Episode onset: 2 days ago. The problem occurs constantly. The problem has been unchanged. Pertinent negatives include no abdominal pain, chest pain, chills, congestion, coughing, diaphoresis, fatigue, fever, headaches, myalgias, nausea, neck pain, rash, sore throat or vomiting. Associated symptoms comments: swelling. Nothing aggravates the symptoms. Treatments tried: benadryl. The treatment provided mild relief.       Constitution: Negative for chills, sweating, fatigue and fever.   HENT: Negative for ear pain, ear discharge, congestion, postnasal drip, sinus pain, sinus pressure, sore throat, trouble swallowing and voice change.    Neck: Negative for neck pain and painful lymph nodes.   Cardiovascular: Negative for chest pain, palpitations and sob on exertion.   Eyes: Negative for eye discharge, eye itching, eye pain, eye redness, double vision, blurred vision and eyelid swelling.   Respiratory: Negative for chest tightness, cough, sputum production, shortness of breath and wheezing.    Gastrointestinal: Negative for abdominal pain, nausea, vomiting and diarrhea.   Musculoskeletal: Negative for muscle ache.   Skin: Negative for color change, pale and rash.   Allergic/Immunologic: Negative for sneezing.   Neurological: Negative for headaches.   Hematologic/Lymphatic: " Negative for swollen lymph nodes.       Objective:      Physical Exam   HENT:   Head: Normocephalic and atraumatic.   Eyes: Conjunctivae are normal. Right eye exhibits no discharge. Left eye exhibits no discharge.      Comments: R natanael orbital swelling, mild   Cardiovascular: Normal rate and regular rhythm.   No murmur heard.  Pulmonary/Chest: Effort normal and breath sounds normal. No stridor. No respiratory distress. He has no wheezes. He has no rhonchi. He has no rales. He exhibits no tenderness.   Nursing note and vitals reviewed.        Assessment:       1. Bee sting, accidental or unintentional, initial encounter          Plan:         Bee sting, accidental or unintentional, initial encounter  -     EPINEPHrine, PF, (ADRENALIN) 1 mg/mL (1 mL) Soln; Inject 0.3 mLs (0.3 mg total) into the skin once. for 1 dose  Dispense: 0.3 mL; Refill: 0                 Patient Instructions   See additional patient Instructions provided    -continue with benadryl  -ice pack for swelling    After taking into careful account the patient's history, physical exam findings, as well as empirical and objective data obtained throughout urgent care workup, I feel no emergent medical condition has been identified. No further evaluation or admission was felt to be required, and the patient is stable for discharge from the . The patient and any additional family present were updated with test results, overall clinical impression, and recommended further plan of care, including discharge instructions as provided and outpatient follow-up for continued evaluation and management as needed. All questions were answered. The patient expressed understanding and agreed with current plan for discharge and follow-up plan of care. Strict ED precautions were provided, including return/worsening of current symptoms, new symptoms, or any other concerns.  - You must understand that you have received an Urgent Care treatment only and that you may be  released before all of your medical problems are known or treated.   - You, the patient, will arrange for follow up care as instructed.   - If your condition worsens or fails to improve we recommend that you receive another evaluation at the ER immediately or contact your PCP to discuss your concerns or return here.         -Answered all patient questions. Patient verbalized understanding and voiced agreement with current treatment plan.

## 2022-08-23 NOTE — PATIENT INSTRUCTIONS
See additional patient Instructions provided    -continue with benadryl  -ice pack for swelling    After taking into careful account the patient's history, physical exam findings, as well as empirical and objective data obtained throughout urgent care workup, I feel no emergent medical condition has been identified. No further evaluation or admission was felt to be required, and the patient is stable for discharge from the . The patient and any additional family present were updated with test results, overall clinical impression, and recommended further plan of care, including discharge instructions as provided and outpatient follow-up for continued evaluation and management as needed. All questions were answered. The patient expressed understanding and agreed with current plan for discharge and follow-up plan of care. Strict ED precautions were provided, including return/worsening of current symptoms, new symptoms, or any other concerns.  - You must understand that you have received an Urgent Care treatment only and that you may be released before all of your medical problems are known or treated.   - You, the patient, will arrange for follow up care as instructed.   - If your condition worsens or fails to improve we recommend that you receive another evaluation at the ER immediately or contact your PCP to discuss your concerns or return here.         -Answered all patient questions. Patient verbalized understanding and voiced agreement with current treatment plan.

## 2023-05-28 ENCOUNTER — OFFICE VISIT (OUTPATIENT)
Dept: URGENT CARE | Facility: CLINIC | Age: 68
End: 2023-05-28
Payer: COMMERCIAL

## 2023-05-28 VITALS
OXYGEN SATURATION: 99 % | SYSTOLIC BLOOD PRESSURE: 136 MMHG | WEIGHT: 258 LBS | HEART RATE: 87 BPM | HEIGHT: 74 IN | RESPIRATION RATE: 18 BRPM | DIASTOLIC BLOOD PRESSURE: 75 MMHG | TEMPERATURE: 99 F | BODY MASS INDEX: 33.11 KG/M2

## 2023-05-28 DIAGNOSIS — M25.461 KNEE EFFUSION, RIGHT: ICD-10-CM

## 2023-05-28 DIAGNOSIS — M10.9 ACUTE GOUT OF RIGHT KNEE, UNSPECIFIED CAUSE: Primary | ICD-10-CM

## 2023-05-28 PROCEDURE — 96372 THER/PROPH/DIAG INJ SC/IM: CPT | Mod: S$GLB,,, | Performed by: NURSE PRACTITIONER

## 2023-05-28 PROCEDURE — 73562 XR KNEE 3 VIEW RIGHT: ICD-10-PCS | Mod: RT,S$GLB,, | Performed by: RADIOLOGY

## 2023-05-28 PROCEDURE — 73562 X-RAY EXAM OF KNEE 3: CPT | Mod: RT,S$GLB,, | Performed by: RADIOLOGY

## 2023-05-28 PROCEDURE — 99214 PR OFFICE/OUTPT VISIT, EST, LEVL IV, 30-39 MIN: ICD-10-PCS | Mod: 25,S$GLB,, | Performed by: NURSE PRACTITIONER

## 2023-05-28 PROCEDURE — 96372 PR INJECTION,THERAP/PROPH/DIAG2ST, IM OR SUBCUT: ICD-10-PCS | Mod: S$GLB,,, | Performed by: NURSE PRACTITIONER

## 2023-05-28 PROCEDURE — 99214 OFFICE O/P EST MOD 30 MIN: CPT | Mod: 25,S$GLB,, | Performed by: NURSE PRACTITIONER

## 2023-05-28 RX ORDER — COLCHICINE 0.6 MG/1
0.6 TABLET ORAL DAILY
Qty: 3 TABLET | Refills: 0 | Status: SHIPPED | OUTPATIENT
Start: 2023-05-28 | End: 2023-05-28

## 2023-05-28 RX ORDER — COLCHICINE 0.6 MG/1
0.6 TABLET ORAL DAILY
COMMUNITY

## 2023-05-28 RX ORDER — COLCHICINE 0.6 MG/1
0.6 TABLET ORAL DAILY
Qty: 3 TABLET | Refills: 0 | Status: SHIPPED | OUTPATIENT
Start: 2023-05-28

## 2023-05-28 RX ORDER — DEXAMETHASONE SODIUM PHOSPHATE 100 MG/10ML
10 INJECTION INTRAMUSCULAR; INTRAVENOUS
Status: COMPLETED | OUTPATIENT
Start: 2023-05-28 | End: 2023-05-28

## 2023-05-28 RX ADMIN — DEXAMETHASONE SODIUM PHOSPHATE 10 MG: 100 INJECTION INTRAMUSCULAR; INTRAVENOUS at 12:05

## 2023-05-28 NOTE — PROGRESS NOTES
"Subjective:      Patient ID: Sammy Eller is a 67 y.o. male.    Vitals:  height is 6' 2" (1.88 m) and weight is 117 kg (258 lb). His tympanic temperature is 99.1 °F (37.3 °C). His blood pressure is 136/75 and his pulse is 87. His respiration is 18 and oxygen saturation is 99%.     Chief Complaint: Leg Pain    67-year-old male presents to clinic for evaluation of nontraumatic right knee pain and swelling for the last 3 days.  Patient does report a history of gout, reports having a recent flare to his ankle.  States pain and swelling similar, however he has noticed an increase in swelling over the last few days.  He does have a history of osteoarthritis.  He denies any injury or fall.  He is awake and alert, answers questions appropriately, no acute distress noted on today's visit.    Leg Pain   The incident occurred 3 to 5 days ago. There was no injury mechanism. The pain is present in the right knee and right hip. The quality of the pain is described as aching. The pain is at a severity of 8/10. The pain is moderate. Associated symptoms include an inability to bear weight.     Constitution: Negative for activity change, appetite change, chills, sweating, fatigue and fever.   Cardiovascular:  Negative for chest pain.   Respiratory:  Negative for shortness of breath.    Gastrointestinal:  Negative for nausea.   Musculoskeletal:  Positive for joint pain and joint swelling. Negative for trauma.   Skin:  Negative for erythema.   Neurological:  Negative for dizziness.    Objective:     Physical Exam   Constitutional: He is oriented to person, place, and time. He appears well-developed.  Non-toxic appearance. He does not appear ill. No distress.   HENT:   Head: Normocephalic and atraumatic. Head is without abrasion, without contusion and without laceration.   Ears:   Right Ear: External ear normal.   Left Ear: External ear normal.   Nose: Nose normal.   Mouth/Throat: Oropharynx is clear and moist and mucous membranes are " normal.   Eyes: Conjunctivae, EOM and lids are normal.   Neck: Trachea normal and phonation normal. Neck supple.   Cardiovascular: Normal rate.   Pulmonary/Chest: Effort normal. No respiratory distress.   Abdominal: Normal appearance.   Musculoskeletal: Normal range of motion.         General: Swelling and tenderness present. No deformity or signs of injury. Normal range of motion.      Right knee: He exhibits swelling and effusion. Tenderness found.        Legs:    Neurological: He is alert and oriented to person, place, and time.   Skin: Skin is warm, dry, intact, not diaphoretic and no rash. No abrasion, No burn, No bruising, No erythema and No ecchymosis   Psychiatric: His speech is normal and behavior is normal. Mood, judgment and thought content normal.   Nursing note and vitals reviewed.  XR KNEE 3 VIEW RIGHT    Result Date: 5/28/2023  EXAMINATION: XR KNEE 3 VIEW RIGHT CLINICAL HISTORY: Gout, unspecified TECHNIQUE: AP, lateral, and Merchant views of the right knee were performed. COMPARISON: None FINDINGS: Three views right knee. There are degenerative changes of the knees noting bilateral medial compartmental narrowing.  There is some degree of osteopenia bilaterally.  There is a mild to moderate right suprapatellar effusion.  There are degenerative changes of the patella.  Edema overlies the knee.     1. No acute displaced fracture or dislocation of the knee noting mild to moderate suprapatellar effusion. Electronically signed by: Khoa Vu MD Date:    05/28/2023 Time:    12:33     Assessment:     1. Acute gout of right knee, unspecified cause    2. Knee effusion, right        Plan:       Acute gout of right knee, unspecified cause  -     dexAMETHasone injection 10 mg  -     Discontinue: colchicine (COLCRYS) 0.6 mg tablet; Take 1 tablet (0.6 mg total) by mouth once daily. Take two pills when you get the prescription then one pill one hour later  Dispense: 3 tablet; Refill: 0  -     XR KNEE 3 VIEW  RIGHT; Future; Expected date: 05/28/2023  -     colchicine (COLCRYS) 0.6 mg tablet; Take 1 tablet (0.6 mg total) by mouth once daily. Take two pills when you get the prescription then one pill one hour later  Dispense: 3 tablet; Refill: 0    Knee effusion, right  -     Ambulatory referral/consult to Orthopedics      - given presenting symptoms, and history of gout, Decadron injection given in clinic for inflammation.  Will treat for possible gout flare, however reviewed x-ray results with obvious knee effusion.  Patient has a history of osteoarthritis.  Referral placed to orthopedics for further evaluation.  I do not suspect septic joint, however strict ER precautions given.  Patient and family member both verbalized understanding, and both in agreement with plan.    Patient Instructions   - Follow up with your PCP or specialty clinic as directed in the next 1-2 weeks if not improved or as needed.  You can call [phone] to schedule an appointment with the appropriate provider.    - Go to the ER or seek medical attention immediately if you develop new or worsening symptoms.    - You must understand that you have received an Urgent Care treatment only and that you may be released before all of your medical problems are known or treated.   - You, the patient, will arrange for follow up care as instructed.   - If your condition worsens or fails to improve we recommend that you receive another evaluation at the ER immediately or contact your PCP to discuss your concerns or return here.

## 2023-05-28 NOTE — PATIENT INSTRUCTIONS
- Follow up with your PCP or specialty clinic as directed in the next 1-2 weeks if not improved or as needed.  You can call [phone] to schedule an appointment with the appropriate provider.    - Go to the ER or seek medical attention immediately if you develop new or worsening symptoms.    - You must understand that you have received an Urgent Care treatment only and that you may be released before all of your medical problems are known or treated.   - You, the patient, will arrange for follow up care as instructed.   - If your condition worsens or fails to improve we recommend that you receive another evaluation at the ER immediately or contact your PCP to discuss your concerns or return here.

## 2023-05-31 ENCOUNTER — OFFICE VISIT (OUTPATIENT)
Dept: ORTHOPEDICS | Facility: CLINIC | Age: 68
End: 2023-05-31
Payer: COMMERCIAL

## 2023-05-31 VITALS
SYSTOLIC BLOOD PRESSURE: 156 MMHG | HEART RATE: 74 BPM | DIASTOLIC BLOOD PRESSURE: 92 MMHG | HEIGHT: 74 IN | BODY MASS INDEX: 33.1 KG/M2 | TEMPERATURE: 98 F | WEIGHT: 257.94 LBS

## 2023-05-31 DIAGNOSIS — M25.461 PAIN AND SWELLING OF RIGHT KNEE: Primary | ICD-10-CM

## 2023-05-31 DIAGNOSIS — Z87.39 HISTORY OF GOUT: ICD-10-CM

## 2023-05-31 DIAGNOSIS — M25.561 PAIN AND SWELLING OF RIGHT KNEE: Primary | ICD-10-CM

## 2023-05-31 LAB
APPEARANCE FLD: NORMAL
BODY FLD TYPE: ABNORMAL
BODY FLD TYPE: NORMAL
COLOR FLD: YELLOW
CRYSTALS FLD MICRO: POSITIVE
GRAM STN SPEC: NORMAL
GRAM STN SPEC: NORMAL
LYMPHOCYTES NFR FLD MANUAL: 8 %
MONOS+MACROS NFR FLD MANUAL: 15 %
NEUTROPHILS NFR FLD MANUAL: 77 %
WBC # FLD: 4950 /CU MM

## 2023-05-31 PROCEDURE — 1101F PR PT FALLS ASSESS DOC 0-1 FALLS W/OUT INJ PAST YR: ICD-10-PCS | Mod: CPTII,S$GLB,, | Performed by: NURSE PRACTITIONER

## 2023-05-31 PROCEDURE — 87075 CULTR BACTERIA EXCEPT BLOOD: CPT | Performed by: NURSE PRACTITIONER

## 2023-05-31 PROCEDURE — 1125F AMNT PAIN NOTED PAIN PRSNT: CPT | Mod: CPTII,S$GLB,, | Performed by: NURSE PRACTITIONER

## 2023-05-31 PROCEDURE — 1125F PR PAIN SEVERITY QUANTIFIED, PAIN PRESENT: ICD-10-PCS | Mod: CPTII,S$GLB,, | Performed by: NURSE PRACTITIONER

## 2023-05-31 PROCEDURE — 1159F PR MEDICATION LIST DOCUMENTED IN MEDICAL RECORD: ICD-10-PCS | Mod: CPTII,S$GLB,, | Performed by: NURSE PRACTITIONER

## 2023-05-31 PROCEDURE — 3008F PR BODY MASS INDEX (BMI) DOCUMENTED: ICD-10-PCS | Mod: CPTII,S$GLB,, | Performed by: NURSE PRACTITIONER

## 2023-05-31 PROCEDURE — 1160F RVW MEDS BY RX/DR IN RCRD: CPT | Mod: CPTII,S$GLB,, | Performed by: NURSE PRACTITIONER

## 2023-05-31 PROCEDURE — 3077F PR MOST RECENT SYSTOLIC BLOOD PRESSURE >= 140 MM HG: ICD-10-PCS | Mod: CPTII,S$GLB,, | Performed by: NURSE PRACTITIONER

## 2023-05-31 PROCEDURE — 89051 BODY FLUID CELL COUNT: CPT | Performed by: NURSE PRACTITIONER

## 2023-05-31 PROCEDURE — 1159F MED LIST DOCD IN RCRD: CPT | Mod: CPTII,S$GLB,, | Performed by: NURSE PRACTITIONER

## 2023-05-31 PROCEDURE — 3077F SYST BP >= 140 MM HG: CPT | Mod: CPTII,S$GLB,, | Performed by: NURSE PRACTITIONER

## 2023-05-31 PROCEDURE — 3080F PR MOST RECENT DIASTOLIC BLOOD PRESSURE >= 90 MM HG: ICD-10-PCS | Mod: CPTII,S$GLB,, | Performed by: NURSE PRACTITIONER

## 2023-05-31 PROCEDURE — 99204 PR OFFICE/OUTPT VISIT, NEW, LEVL IV, 45-59 MIN: ICD-10-PCS | Mod: 25,S$GLB,, | Performed by: NURSE PRACTITIONER

## 2023-05-31 PROCEDURE — 87102 FUNGUS ISOLATION CULTURE: CPT | Performed by: NURSE PRACTITIONER

## 2023-05-31 PROCEDURE — 3288F FALL RISK ASSESSMENT DOCD: CPT | Mod: CPTII,S$GLB,, | Performed by: NURSE PRACTITIONER

## 2023-05-31 PROCEDURE — 3288F PR FALLS RISK ASSESSMENT DOCUMENTED: ICD-10-PCS | Mod: CPTII,S$GLB,, | Performed by: NURSE PRACTITIONER

## 2023-05-31 PROCEDURE — 89060 EXAM SYNOVIAL FLUID CRYSTALS: CPT | Performed by: NURSE PRACTITIONER

## 2023-05-31 PROCEDURE — 87205 SMEAR GRAM STAIN: CPT | Performed by: NURSE PRACTITIONER

## 2023-05-31 PROCEDURE — 87116 MYCOBACTERIA CULTURE: CPT | Performed by: NURSE PRACTITIONER

## 2023-05-31 PROCEDURE — 1101F PT FALLS ASSESS-DOCD LE1/YR: CPT | Mod: CPTII,S$GLB,, | Performed by: NURSE PRACTITIONER

## 2023-05-31 PROCEDURE — 3080F DIAST BP >= 90 MM HG: CPT | Mod: CPTII,S$GLB,, | Performed by: NURSE PRACTITIONER

## 2023-05-31 PROCEDURE — 1160F PR REVIEW ALL MEDS BY PRESCRIBER/CLIN PHARMACIST DOCUMENTED: ICD-10-PCS | Mod: CPTII,S$GLB,, | Performed by: NURSE PRACTITIONER

## 2023-05-31 PROCEDURE — 3008F BODY MASS INDEX DOCD: CPT | Mod: CPTII,S$GLB,, | Performed by: NURSE PRACTITIONER

## 2023-05-31 PROCEDURE — 99999 PR PBB SHADOW E&M-EST. PATIENT-LVL IV: ICD-10-PCS | Mod: PBBFAC,,, | Performed by: NURSE PRACTITIONER

## 2023-05-31 PROCEDURE — 87206 SMEAR FLUORESCENT/ACID STAI: CPT | Performed by: NURSE PRACTITIONER

## 2023-05-31 PROCEDURE — 20610 DRAIN/INJ JOINT/BURSA W/O US: CPT | Mod: RT,S$GLB,, | Performed by: NURSE PRACTITIONER

## 2023-05-31 PROCEDURE — 20610 LARGE JOINT ASPIRATION/INJECTION: R KNEE: ICD-10-PCS | Mod: RT,S$GLB,, | Performed by: NURSE PRACTITIONER

## 2023-05-31 PROCEDURE — 99204 OFFICE O/P NEW MOD 45 MIN: CPT | Mod: 25,S$GLB,, | Performed by: NURSE PRACTITIONER

## 2023-05-31 PROCEDURE — 99999 PR PBB SHADOW E&M-EST. PATIENT-LVL IV: CPT | Mod: PBBFAC,,, | Performed by: NURSE PRACTITIONER

## 2023-05-31 RX ORDER — TRIAMCINOLONE ACETONIDE 40 MG/ML
40 INJECTION, SUSPENSION INTRA-ARTICULAR; INTRAMUSCULAR
Status: DISCONTINUED | OUTPATIENT
Start: 2023-05-31 | End: 2023-05-31 | Stop reason: HOSPADM

## 2023-05-31 RX ORDER — LIDOCAINE HYDROCHLORIDE 10 MG/ML
4 INJECTION INFILTRATION; PERINEURAL
Status: DISCONTINUED | OUTPATIENT
Start: 2023-05-31 | End: 2023-05-31 | Stop reason: HOSPADM

## 2023-05-31 RX ADMIN — TRIAMCINOLONE ACETONIDE 40 MG: 40 INJECTION, SUSPENSION INTRA-ARTICULAR; INTRAMUSCULAR at 08:05

## 2023-05-31 RX ADMIN — LIDOCAINE HYDROCHLORIDE 4 ML: 10 INJECTION INFILTRATION; PERINEURAL at 08:05

## 2023-05-31 NOTE — PROGRESS NOTES
Patient seen in conjunction with PA student and MSN student.  Agree with PA student history, assessment and plan.    SUBJECTIVE:     Chief Complaint & History of Present Illness:  Sammy Eller is a New 67 y.o. year old male patient here with a history of constant right knee pain which started 4 days ago. There is not a history of trauma. The pain is located in the medial, lateral aspect of the knee.  The pain is described as dull and achy, 9/10.  There is radiation to the ankle and hip.  There is not catching or locking.  Aggravating factors include any weight bearing, going up and down stairs, standing, and walking.  Associated symptoms include effusion.  There is not numbness or tingling of the lower extremity.  There is not back pain. Previous treatments include colchicine and an IM dexamethasone given last Sunday at urgent care without relief. He has a history of gout and had a flare up in the L knee 3 weeks ago. The patient  has been getting around with crutches .     Review of patient's allergies indicates:  No Known Allergies      Current Outpatient Medications   Medication Sig Dispense Refill    aspirin (ECOTRIN) 81 MG EC tablet Take 81 mg by mouth.      colchicine (COLCRYS) 0.6 mg tablet Take 0.6 mg by mouth once daily.      colchicine (COLCRYS) 0.6 mg tablet Take 1 tablet (0.6 mg total) by mouth once daily. Take two pills when you get the prescription then one pill one hour later 3 tablet 0    gabapentin (NEURONTIN) 100 MG capsule       LORazepam (ATIVAN) 1 MG tablet Take 1 tablet (1 mg total) by mouth every 8 (eight) hours as needed (Muscular spasm.). 10 tablet 0    olmesartan (BENICAR) 40 MG tablet   3    rosuvastatin (CRESTOR) 20 MG tablet Take 20 mg by mouth once daily.      tadalafil (CIALIS) 20 MG Tab Take 20 mg by mouth.       No current facility-administered medications for this visit.       Past Medical History:   Diagnosis Date    Hyperlipidemia     Hypertension        No past surgical history  "on file.    No family history on file.      Review of Systems:  ROS:  Constitutional: no fever or chills  Eyes: no visual changes  ENT: no nasal congestion or sore throat  Respiratory: no cough or shortness of breath  Cardiovascular: no chest pain or palpitations  Gastrointestinal: no nausea or vomiting, tolerating diet  Genitourinary: no hematuria or dysuria  Hematologic/Lymphatic: no easy bruising or lymphadenopathy  Musculoskeletal: positive for arthralgias and stiff joints  Neurological: no seizures or tremors  Behavioral/Psych: no auditory or visual hallucinations  Endocrine: no heat or cold intolerance      OBJECTIVE:     PHYSICAL EXAM:  Vital Signs (Most Recent)  Vitals:    05/31/23 0850   BP: (!) 156/92   Pulse: 74   Temp: 98.4 °F (36.9 °C)     Height: 6' 2" (188 cm) Weight: 117 kg (257 lb 15 oz),   Estimated body mass index is 33.12 kg/m² as calculated from the following:    Height as of this encounter: 6' 2" (1.88 m).    Weight as of this encounter: 117 kg (257 lb 15 oz).   General Appearance: Well nourished, well developed, in no acute distress.  HENT: Normal cephalic, oropharynx pink and moist  Eyes: PERRLA bilaterally and EOM x 4  Respiratory: Even and unlabored  Skin: Warm and Dry. Erythema and swelling noted to medial and superior lateral aspect of R knee  Psychiatric: AAO x 4, Mood & affect are normal.    Right Knee Exam:  Knee Range of Motion: limited by pain, 90 degrees flexion, and pain with terminal extension   Effusion: positive fluid wave  Condition of skin: intact and moderate erythema  Location of tenderness:Medial joint line and Lateral joint line   Strength: limited by pain and 3 of 5      left  Knee Exam:  Knee Range of Motion:normal   Effusion:none  Condition of skin:intact  Location of tenderness:None   Strength:normal      RADIOGRAPHS:  5/28/23   FINDINGS:  Three views right knee.     There are degenerative changes of the knees noting bilateral medial compartmental narrowing.  There is " some degree of osteopenia bilaterally.  There is a mild to moderate right suprapatellar effusion.  There are degenerative changes of the patella.  Edema overlies the knee.     Impression:     1. No acute displaced fracture or dislocation of the knee noting mild to moderate suprapatellar effusion.    All radiographs were personally reviewed by me.    ASSESSMENT/PLAN:       ICD-10-CM ICD-9-CM   1. Pain and swelling of right knee  M25.561 719.46    M25.461 719.06   2. History of gout  Z87.39 V12.29       -Sammy Eller presents to clinic today with c/c right knee pain for the past 4 days  -X-ray as above.  - Drainage of R knee produced 25mL of mildly cloudy synovial fluid concerning for possible gout. - (see procedural note)  - Specimen sent to lab for C&S and evaluation for gout  - Triamcinolone joint injection given - (see procedural note)  -Recommend RICE therapy.  - Strict return precautions were given in case of fever, increasing joint pain, redness, or any other signs/symptoms of possible septic joint formation.     RAF Ngo-S    Fluid analysis results thus far:  WBC 4950  Segs 77  + uric acid crystals present    Refer to rheumatology for gout.

## 2023-05-31 NOTE — PROCEDURES
Large Joint Aspiration/Injection: R knee    Date/Time: 5/31/2023 8:45 AM  Performed by: Peterson Pickard NP  Authorized by: Peterson Pickard NP     Consent Done?:  Yes (Verbal)  Indications:  Arthritis, pain and joint swelling  Site marked: the procedure site was marked    Timeout: prior to procedure the correct patient, procedure, and site was verified      Local anesthesia used?: Yes    Local anesthetic:  Lidocaine spray    Details:  Needle Size:  22 G  Ultrasonic Guidance for needle placement?: No    Approach:  Anterolateral  Location:  Knee  Site:  R knee  Medications:  4 mL LIDOcaine HCL 10 mg/ml (1%) 10 mg/mL (1 %); 40 mg triamcinolone acetonide 40 mg/mL  Aspirate amount (mL):  25  Aspirate:  Yellow and cloudy  Lab: fluid sent for laboratory analysis    Patient tolerance:  Patient tolerated the procedure well with no immediate complications     An Ace wrap was applied to the knee, advised the patient to wear the Ace wrap for the next 48 hours only removing at bedtime.  Also advised the patient I will call him with any abnormal fluid analysis results.

## 2023-06-01 LAB — PATH INTERP FLD-IMP: NORMAL

## 2023-06-08 ENCOUNTER — PATIENT MESSAGE (OUTPATIENT)
Dept: RHEUMATOLOGY | Facility: CLINIC | Age: 68
End: 2023-06-08

## 2023-06-08 ENCOUNTER — OFFICE VISIT (OUTPATIENT)
Dept: RHEUMATOLOGY | Facility: CLINIC | Age: 68
End: 2023-06-08
Payer: COMMERCIAL

## 2023-06-08 ENCOUNTER — HOSPITAL ENCOUNTER (OUTPATIENT)
Dept: RADIOLOGY | Facility: HOSPITAL | Age: 68
Discharge: HOME | End: 2023-06-08
Attending: INTERNAL MEDICINE
Payer: COMMERCIAL

## 2023-06-08 VITALS
DIASTOLIC BLOOD PRESSURE: 86 MMHG | WEIGHT: 253.5 LBS | HEART RATE: 57 BPM | HEIGHT: 74 IN | BODY MASS INDEX: 32.53 KG/M2 | SYSTOLIC BLOOD PRESSURE: 138 MMHG

## 2023-06-08 DIAGNOSIS — M79.671 PAIN IN BOTH FEET: ICD-10-CM

## 2023-06-08 DIAGNOSIS — M25.561 PAIN AND SWELLING OF RIGHT KNEE: ICD-10-CM

## 2023-06-08 DIAGNOSIS — M79.672 PAIN IN BOTH FEET: ICD-10-CM

## 2023-06-08 DIAGNOSIS — M79.672 PAIN IN BOTH FEET: Primary | ICD-10-CM

## 2023-06-08 DIAGNOSIS — M79.671 PAIN IN BOTH FEET: Primary | ICD-10-CM

## 2023-06-08 DIAGNOSIS — M25.461 PAIN AND SWELLING OF RIGHT KNEE: ICD-10-CM

## 2023-06-08 DIAGNOSIS — Z87.39 HISTORY OF GOUT: ICD-10-CM

## 2023-06-08 DIAGNOSIS — M10.9 GOUT, ARTHRITIS: ICD-10-CM

## 2023-06-08 LAB — BACTERIA SPEC ANAEROBE CULT: NORMAL

## 2023-06-08 PROCEDURE — 1125F PR PAIN SEVERITY QUANTIFIED, PAIN PRESENT: ICD-10-PCS | Mod: CPTII,S$GLB,, | Performed by: INTERNAL MEDICINE

## 2023-06-08 PROCEDURE — 3008F PR BODY MASS INDEX (BMI) DOCUMENTED: ICD-10-PCS | Mod: CPTII,S$GLB,, | Performed by: INTERNAL MEDICINE

## 2023-06-08 PROCEDURE — 3288F FALL RISK ASSESSMENT DOCD: CPT | Mod: CPTII,S$GLB,, | Performed by: INTERNAL MEDICINE

## 2023-06-08 PROCEDURE — 1159F PR MEDICATION LIST DOCUMENTED IN MEDICAL RECORD: ICD-10-PCS | Mod: CPTII,S$GLB,, | Performed by: INTERNAL MEDICINE

## 2023-06-08 PROCEDURE — 3079F DIAST BP 80-89 MM HG: CPT | Mod: CPTII,S$GLB,, | Performed by: INTERNAL MEDICINE

## 2023-06-08 PROCEDURE — 1125F AMNT PAIN NOTED PAIN PRSNT: CPT | Mod: CPTII,S$GLB,, | Performed by: INTERNAL MEDICINE

## 2023-06-08 PROCEDURE — 1160F PR REVIEW ALL MEDS BY PRESCRIBER/CLIN PHARMACIST DOCUMENTED: ICD-10-PCS | Mod: CPTII,S$GLB,, | Performed by: INTERNAL MEDICINE

## 2023-06-08 PROCEDURE — 99204 OFFICE O/P NEW MOD 45 MIN: CPT | Mod: S$GLB,,, | Performed by: INTERNAL MEDICINE

## 2023-06-08 PROCEDURE — 73630 X-RAY EXAM OF FOOT: CPT | Mod: 26,,, | Performed by: RADIOLOGY

## 2023-06-08 PROCEDURE — 3075F PR MOST RECENT SYSTOLIC BLOOD PRESS GE 130-139MM HG: ICD-10-PCS | Mod: CPTII,S$GLB,, | Performed by: INTERNAL MEDICINE

## 2023-06-08 PROCEDURE — 3288F PR FALLS RISK ASSESSMENT DOCUMENTED: ICD-10-PCS | Mod: CPTII,S$GLB,, | Performed by: INTERNAL MEDICINE

## 2023-06-08 PROCEDURE — 73630 XR FOOT COMPLETE 3 VIEW BILATERAL: ICD-10-PCS | Mod: 26,,, | Performed by: RADIOLOGY

## 2023-06-08 PROCEDURE — 1159F MED LIST DOCD IN RCRD: CPT | Mod: CPTII,S$GLB,, | Performed by: INTERNAL MEDICINE

## 2023-06-08 PROCEDURE — 3079F PR MOST RECENT DIASTOLIC BLOOD PRESSURE 80-89 MM HG: ICD-10-PCS | Mod: CPTII,S$GLB,, | Performed by: INTERNAL MEDICINE

## 2023-06-08 PROCEDURE — 99204 PR OFFICE/OUTPT VISIT, NEW, LEVL IV, 45-59 MIN: ICD-10-PCS | Mod: S$GLB,,, | Performed by: INTERNAL MEDICINE

## 2023-06-08 PROCEDURE — 99999 PR PBB SHADOW E&M-EST. PATIENT-LVL IV: ICD-10-PCS | Mod: PBBFAC,,, | Performed by: INTERNAL MEDICINE

## 2023-06-08 PROCEDURE — 3075F SYST BP GE 130 - 139MM HG: CPT | Mod: CPTII,S$GLB,, | Performed by: INTERNAL MEDICINE

## 2023-06-08 PROCEDURE — 1101F PR PT FALLS ASSESS DOC 0-1 FALLS W/OUT INJ PAST YR: ICD-10-PCS | Mod: CPTII,S$GLB,, | Performed by: INTERNAL MEDICINE

## 2023-06-08 PROCEDURE — 1101F PT FALLS ASSESS-DOCD LE1/YR: CPT | Mod: CPTII,S$GLB,, | Performed by: INTERNAL MEDICINE

## 2023-06-08 PROCEDURE — 73630 X-RAY EXAM OF FOOT: CPT | Mod: TC,50

## 2023-06-08 PROCEDURE — 99999 PR PBB SHADOW E&M-EST. PATIENT-LVL IV: CPT | Mod: PBBFAC,,, | Performed by: INTERNAL MEDICINE

## 2023-06-08 PROCEDURE — 1160F RVW MEDS BY RX/DR IN RCRD: CPT | Mod: CPTII,S$GLB,, | Performed by: INTERNAL MEDICINE

## 2023-06-08 PROCEDURE — 3008F BODY MASS INDEX DOCD: CPT | Mod: CPTII,S$GLB,, | Performed by: INTERNAL MEDICINE

## 2023-06-08 RX ORDER — AMLODIPINE BESYLATE 10 MG/1
10 TABLET ORAL
COMMUNITY
Start: 2023-03-28

## 2023-06-08 RX ORDER — METHYLPREDNISOLONE 4 MG/1
TABLET ORAL
Qty: 21 TABLET | Refills: 1 | Status: SHIPPED | OUTPATIENT
Start: 2023-06-08

## 2023-06-08 NOTE — PROGRESS NOTES
"Subjective:       Patient ID: Sammy Eller is a 67 y.o. male.    Chief Complaint: Gout    HPI:  Sammy Eller is a 67 y.o. male with gout, HTN, and elevated cholesterol sent in for management of gout.  Patient reports history of gout since his 40s.  His first attack was in the foot.  He has had knee and elbow aspirated in past.  May 2023 had attack in left foot then followed by right knee involvement.  Orthopedic aspirated knee and found gout crystals.   In past took allopurinol for about one year but kidney doctor (Dr. Duncan) stopped it due to kidney issues one year ago.   Now takes colchicine 0.6 mg twice daily.  He has had about 4 gout attacks in last 12 months.  Beer, crawfish and fish bones can make gout happen.     Feet hurt 5/10 ache when get out of bed but usually improves.  Gout attack pain is >10/10 ache.     Review of Systems   Constitutional:  Negative for fever and unexpected weight change.   HENT:  Negative for mouth sores and trouble swallowing.    Eyes:  Negative for redness.   Respiratory:  Negative for cough and shortness of breath.    Cardiovascular:  Negative for chest pain.   Gastrointestinal:  Negative for constipation and diarrhea.   Genitourinary:  Negative for genital sores.   Musculoskeletal:  Positive for arthralgias.   Skin:  Negative for rash.   Neurological:  Negative for headaches.   Hematological:  Does not bruise/bleed easily.   Psychiatric/Behavioral: Negative.         Objective:   /86   Pulse (!) 57   Ht 6' 2" (1.88 m)   Wt 115 kg (253 lb 8.5 oz)   BMI 32.55 kg/m²      Physical Exam   Constitutional: He is oriented to person, place, and time. normal appearance.   HENT:   Head: Normocephalic and atraumatic.   Cardiovascular: Normal rate and regular rhythm.   Pulmonary/Chest: Effort normal and breath sounds normal.   Abdominal: Soft. Bowel sounds are normal.   Musculoskeletal:      Comments: 28 joint count: 0 swollen and 0 tender   Neurological: He is alert and oriented to " person, place, and time.   Skin: Skin is warm and dry.   Psychiatric: Mood normal.        Assessment:       Gout  Renal insufficiency  Obesity   Plan:       Obtain labs from primary. Check x-ray feet. At the onset of gout attack take 2 colchicine then 1 tab an hour later. If attack not calming down take Medrol pack.  Follow with nephrology  Increase bike riding  RTO 6 months/prn

## 2023-06-08 NOTE — PATIENT INSTRUCTIONS
At the onset of gout attack take 2 colchicine then 1 tab an hour later. If attack not calming down take Medrol pack.    Contact Dr. Steiner ([phone]) if no improvement.     Review information [facility] (Febuxostat)

## 2023-06-08 NOTE — PROGRESS NOTES
Rapid3 Question Responses and Scores 6/8/2023   MDHAQ Score 0   Psychologic Score 0   Pain Score 4   When you awakened in the morning OVER THE LAST WEEK, did you feel stiff? Yes   Fatigue Score 0   Global Health Score 0   RAPID3 Score 1.33     Answers submitted by the patient for this visit:  Rheumatology Questionnaire (Submitted on 6/8/2023)  fever: No  eye redness: No  mouth sores: No  headaches: No  shortness of breath: No  chest pain: No  trouble swallowing: No  diarrhea: No  constipation: No  unexpected weight change: No  genital sore: No  During the last 3 days, have you had a skin rash?: No  Bruises or bleeds easily: No  cough: No

## 2023-06-27 ENCOUNTER — TELEPHONE (OUTPATIENT)
Dept: RHEUMATOLOGY | Facility: CLINIC | Age: 68
End: 2023-06-27
Payer: COMMERCIAL

## 2023-06-27 ENCOUNTER — PATIENT MESSAGE (OUTPATIENT)
Dept: RHEUMATOLOGY | Facility: CLINIC | Age: 68
End: 2023-06-27
Payer: COMMERCIAL

## 2023-07-06 LAB — FUNGUS SPEC CULT: NORMAL

## 2023-07-19 LAB
ACID FAST MOD KINY STN SPEC: NORMAL
MYCOBACTERIUM SPEC QL CULT: NORMAL

## 2024-06-11 ENCOUNTER — OFFICE VISIT (OUTPATIENT)
Dept: URGENT CARE | Facility: CLINIC | Age: 69
End: 2024-06-11
Payer: COMMERCIAL

## 2024-06-11 VITALS
SYSTOLIC BLOOD PRESSURE: 147 MMHG | WEIGHT: 251.31 LBS | DIASTOLIC BLOOD PRESSURE: 83 MMHG | BODY MASS INDEX: 32.25 KG/M2 | RESPIRATION RATE: 16 BRPM | TEMPERATURE: 99 F | HEIGHT: 74 IN | HEART RATE: 86 BPM | OXYGEN SATURATION: 98 %

## 2024-06-11 DIAGNOSIS — M10.9 ACUTE GOUT OF RIGHT WRIST, UNSPECIFIED CAUSE: Primary | ICD-10-CM

## 2024-06-11 PROCEDURE — 99213 OFFICE O/P EST LOW 20 MIN: CPT | Mod: S$GLB,,,

## 2024-06-11 RX ORDER — COLCHICINE 0.6 MG/1
TABLET ORAL
Qty: 10 TABLET | Refills: 1 | Status: SHIPPED | OUTPATIENT
Start: 2024-06-11

## 2024-06-11 RX ORDER — PREDNISONE 20 MG/1
40 TABLET ORAL DAILY
Qty: 10 TABLET | Refills: 0 | Status: SHIPPED | OUTPATIENT
Start: 2024-06-11 | End: 2024-06-16

## 2024-06-11 RX ORDER — COLCHICINE 0.6 MG/1
TABLET ORAL
Qty: 10 TABLET | Refills: 1 | Status: SHIPPED | OUTPATIENT
Start: 2024-06-11 | End: 2024-06-11

## 2024-06-11 RX ORDER — PREDNISONE 20 MG/1
40 TABLET ORAL DAILY
Qty: 10 TABLET | Refills: 0 | Status: SHIPPED | OUTPATIENT
Start: 2024-06-11 | End: 2024-06-11

## 2024-06-11 NOTE — PATIENT INSTRUCTIONS
Colchicine  Day 1: Take 2 tablets at the first sign of flare, followed by 1 tablet after one hour   Day 2 and thereafter: 1 tablet once or twice daily until flare resolves. Some experts continue for 48 hours after flare resolves  ---- Take medications with food and water to avoid GI upset.   ---- may cause cause diarrhea; bland diet if it occurs    Take prednisone (steroid) as directed x 5 days. Take in the morning so it does not keep you up at night. Take with food and water. This medication can increase your BP and sugar level. Please monitor and eat a well balanced diet. Avoid high salt content foods and eat more foods with potassium, magnesium and calcium.      Take extra strength tylenol every 8 hours as needed for added pain relief.     Wear the wrist brace for immobilization. Rest and elevate the affected painful area.  Apply cold compresses intermittently as needed.  Avoid activities that cause pain. As pain recedes, begin normal activities slowly as tolerated.        Make sure you are drinking plenty of water per day with a nutritious diet. A Mediterranean diet helps decrease inflammation. Elevate your foot today and intermittently apply an ice pack. Avoid alcohol, red meat, shellfish and sweetened drinks.      The recommended daily fluid intake for men is 3.7 liters (seven 16 oz bottles).     Please follow up with ortho for continued/worsening symptoms in a few days. A referral was placed for you, call [phone] to schedule appointment.          ([facility] recommendation)  The Mediterranean diet is based on plant-based foods and healthy fats.     You eat LOTS of vegetables, fruits, beans, lentils, nuts, whole grains (wheat bread, brown rice) & extra virgin olive oil.   ---- These foods are high in fiber and antioxidants.   ---- These nutrients help reduce inflammation.   ---- Fiber helps regulate bowel movements.   ---- Antioxidants protect you against cancer.     Eat a moderate amount of fish  (salmon, herring, mackerel, sardines, anchovies, halibut, rainbow trout, tuna)   ---- (fish are high in omega-3 fatty acids)    Eat a moderate amount of cheese and yogurt.    Eat little or no meat-- eat more poultry (baked chicken, grilled chicken, ground turkey)  ---- avoid red meat and pork (causes inflammation and linked to colon cancer)    Eat little or no sweats, sugary drinks or butter.     Limit your sodium intake. A diet high in salt can increase your blood pressure and predisposes you to a heart attack or stroke.     BENEFITS OF DIET  --- Lowers your risk of cardiovascular disease (heart attack, stroke) and many other diseases.   --- Supports a healthy body weight.   --- Supports a healthy blood sugar, blood pressure and cholesterol.   --- Lowers your risk of metabolic syndrome   --- Supports a heathy balance of good gut bacteria in your digestive system.   --- Lowers your risk for cancer.   --- Helps with brain function and slows decline as we age.   --- Helps you live longer.     Talk to your primary care doctor about a dietician referral for further guidance.

## 2024-06-11 NOTE — PROGRESS NOTES
"Subjective:      Patient ID: Sammy Eller is a 68 y.o. male.    Vitals:  height is 6' 2" (1.88 m) and weight is 114 kg (251 lb 5.2 oz). His temperature is 98.5 °F (36.9 °C). His blood pressure is 147/83 (abnormal) and his pulse is 86. His respiration is 16 and oxygen saturation is 98%.     Chief Complaint: Hand Pain (/)    Pt here for right hand pain onset 5 days ago. Pt sts its mostly in his thumb and radiates up and down into his wrist and forearm. Pt sts his thumb is very stiff and the pain is achy and throbbing. Pt sts he has hx of gout but never in his hand. Pt has taken tylenol with no relief. Pt denies any injury, trauma, numbness or tingling.     Hand Pain   His dominant hand is their right hand. The incident occurred 5 to 7 days ago. There was no injury mechanism. The pain is present in the right hand and right wrist. The quality of the pain is described as aching and shooting. The pain radiates to the right arm. The pain is at a severity of 7/10. The pain is moderate. The pain has been Constant since the incident. Pertinent negatives include no chest pain, muscle weakness, numbness or tingling. The symptoms are aggravated by movement, palpation and lifting. He has tried acetaminophen for the symptoms. The treatment provided no relief.       Constitution: Negative for chills, fatigue and fever.   Cardiovascular:  Negative for chest pain.   Musculoskeletal:  Positive for pain, joint pain, joint swelling, abnormal ROM of joint and gout. Negative for trauma.   Skin:  Positive for erythema.   Neurological:  Negative for numbness.      Objective:     Physical Exam   Constitutional: He is oriented to person, place, and time. He appears well-developed.   HENT:   Head: Normocephalic and atraumatic. Head is without abrasion, without contusion and without laceration.   Ears:   Right Ear: External ear normal.   Left Ear: External ear normal.   Nose: Nose normal.   Mouth/Throat: Oropharynx is clear and moist and " mucous membranes are normal.   Eyes: Conjunctivae, EOM and lids are normal. Pupils are equal, round, and reactive to light.   Neck: Trachea normal and phonation normal. Neck supple.   Cardiovascular: Normal rate, regular rhythm and normal heart sounds.   Pulmonary/Chest: Effort normal and breath sounds normal. No stridor. No respiratory distress.   Musculoskeletal:      Right wrist: He exhibits decreased range of motion, tenderness and swelling (erythema and warmth). He exhibits no effusion and no deformity.   Neurological: He is alert and oriented to person, place, and time.   Skin: Skin is warm, dry, intact and no rash. Capillary refill takes less than 2 seconds. erythema No abrasion, No burn, No bruising and No ecchymosis   Psychiatric: His speech is normal and behavior is normal. Judgment and thought content normal.   Nursing note and vitals reviewed.      Assessment:     1. Acute gout of right wrist, unspecified cause        Plan:       Acute gout of right wrist, unspecified cause  -     Ambulatory referral/consult to Orthopedics  -     predniSONE (DELTASONE) 20 MG tablet; Take 2 tablets (40 mg total) by mouth once daily. for 5 days  Dispense: 10 tablet; Refill: 0  -     colchicine (COLCRYS) 0.6 mg tablet; Take 2 tablets (1.2 mg) at the first sign of flare, then 1 tablet (0.6 mg) by mouth one hour later  Dispense: 10 tablet; Refill: 1  -     WRIST BRACE FOR HOME USE            Discussed results/diagnosis/plan with patient in clinic. Strict precautions given to patient to monitor for worsening signs and symptoms. Advised to follow up with PCP or specialist.  Explained side effects of medications prescribed with patient and informed him/her to discontinue use if he/she has any side effects and to inform UC or PCP if this occurs. All questions answered. Strict ED verses clinic return precautions stressed and given in depth. Advised if symptoms worsens of fail to improve he/she should go to the Emergency Room.  Discharge and follow-up instructions given verbally/printed with the patient who expressed understanding and willingness to comply with my recommendations. Patient voiced understanding and in agreement with current treatment plan. Patient exits the exam room in no acute distress. Conversant and engaged during discharge discussion, verbalized understanding.